# Patient Record
Sex: FEMALE | Race: WHITE
[De-identification: names, ages, dates, MRNs, and addresses within clinical notes are randomized per-mention and may not be internally consistent; named-entity substitution may affect disease eponyms.]

---

## 2019-04-05 PROBLEM — Z00.00 ENCOUNTER FOR PREVENTIVE HEALTH EXAMINATION: Status: ACTIVE | Noted: 2019-04-05

## 2019-04-08 ENCOUNTER — RECORD ABSTRACTING (OUTPATIENT)
Age: 64
End: 2019-04-08

## 2019-04-08 ENCOUNTER — RX RENEWAL (OUTPATIENT)
Age: 64
End: 2019-04-08

## 2019-04-08 ENCOUNTER — APPOINTMENT (OUTPATIENT)
Dept: ORTHOPEDIC SURGERY | Facility: CLINIC | Age: 64
End: 2019-04-08
Payer: COMMERCIAL

## 2019-04-08 DIAGNOSIS — M20.019 MALLET FINGER OF UNSPECIFIED FINGER(S): ICD-10-CM

## 2019-04-08 DIAGNOSIS — M19.91 PRIMARY OSTEOARTHRITIS, UNSPECIFIED SITE: ICD-10-CM

## 2019-04-08 DIAGNOSIS — M19.90 UNSPECIFIED OSTEOARTHRITIS, UNSPECIFIED SITE: ICD-10-CM

## 2019-04-08 DIAGNOSIS — Z82.49 FAMILY HISTORY OF ISCHEMIC HEART DISEASE AND OTHER DISEASES OF THE CIRCULATORY SYSTEM: ICD-10-CM

## 2019-04-08 DIAGNOSIS — I83.93 ASYMPTOMATIC VARICOSE VEINS OF BILATERAL LOWER EXTREMITIES: ICD-10-CM

## 2019-04-08 DIAGNOSIS — Z87.01 PERSONAL HISTORY OF PNEUMONIA (RECURRENT): ICD-10-CM

## 2019-04-08 DIAGNOSIS — S62.609A FRACTURE OF UNSPECIFIED PHALANX OF UNSPECIFIED FINGER, INITIAL ENCOUNTER FOR CLOSED FRACTURE: ICD-10-CM

## 2019-04-08 DIAGNOSIS — Z82.61 FAMILY HISTORY OF ARTHRITIS: ICD-10-CM

## 2019-04-08 DIAGNOSIS — Z80.9 FAMILY HISTORY OF MALIGNANT NEOPLASM, UNSPECIFIED: ICD-10-CM

## 2019-04-08 PROCEDURE — 20610 DRAIN/INJ JOINT/BURSA W/O US: CPT | Mod: 50

## 2019-04-08 PROCEDURE — 99213 OFFICE O/P EST LOW 20 MIN: CPT | Mod: 25

## 2019-04-08 RX ORDER — CLOBETASOL PROPIONATE 0.5 MG/G
0.05 CREAM TOPICAL
Refills: 0 | Status: ACTIVE | COMMUNITY

## 2019-04-08 RX ORDER — HYDROCODONE BITARTRATE AND ACETAMINOPHEN 5; 300 MG/1; MG/1
5-300 TABLET ORAL
Refills: 0 | Status: ACTIVE | COMMUNITY

## 2019-04-08 RX ORDER — ESTRADIOL 0.1 MG/G
0.1 CREAM VAGINAL
Refills: 0 | Status: ACTIVE | COMMUNITY

## 2019-04-08 RX ORDER — HYLAN G-F 20 16MG/2ML
48 SYRINGE (ML) INTRAARTICULAR
Qty: 1 | Refills: 0 | Status: ACTIVE | OUTPATIENT
Start: 2019-04-08

## 2019-04-08 RX ORDER — POLYETHYLENE GLYCOL-3350 AND ELECTROLYTES 236; 6.74; 5.86; 2.97; 22.74 G/274.31G; G/274.31G; G/274.31G; G/274.31G; G/274.31G
236 POWDER, FOR SOLUTION ORAL
Refills: 0 | Status: ACTIVE | COMMUNITY

## 2019-04-08 RX ORDER — OXYCODONE AND ACETAMINOPHEN 7.5; 325 MG/1; MG/1
7.5-325 TABLET ORAL
Refills: 0 | Status: ACTIVE | COMMUNITY

## 2019-04-08 RX ORDER — ALPRAZOLAM 0.5 MG/1
0.5 TABLET ORAL
Refills: 0 | Status: ACTIVE | COMMUNITY

## 2019-04-08 RX ORDER — ASPIRIN 325 MG/1
TABLET, FILM COATED ORAL
Refills: 0 | Status: ACTIVE | COMMUNITY

## 2019-04-08 RX ORDER — ZOLPIDEM TARTRATE 10 MG/1
10 TABLET ORAL
Refills: 0 | Status: ACTIVE | COMMUNITY

## 2019-04-08 RX ORDER — HYALURONATE SOD, CROSS-LINKED 30 MG/3 ML
30 SYRINGE (ML) INTRAARTICULAR
Refills: 0 | Status: ACTIVE | COMMUNITY

## 2019-04-08 RX ORDER — TRIAMTERENE AND HYDROCHLOROTHIAZIDE 25; 37.5 MG/1; MG/1
37.5-25 TABLET ORAL
Refills: 0 | Status: ACTIVE | COMMUNITY

## 2019-04-08 NOTE — ASSESSMENT
[FreeTextEntry1] : The patient is to follow up on an as needed basis and/or once her Visco supplementation is authorized.

## 2019-04-08 NOTE — PROCEDURE
[de-identified] : Patient has demonstrated limited relief from NSAIDS, rest, exercises / PT, and after discussion of the risks and benefits, the patient has elected to proceed with a corticosteroid injection into the BOTH knee(s) via an Anterolateral site.\par Confirmed that the patient does not have history of prior adverse reactions, active, infections, or relevant allergies.   There was no erythema or warmth, and the skin was clear.  The skin was sterilized with alcohol and via sterile technique, the knee was injected 3 cc of 1% xylocaine with 5 mg Kenalog.  The injection was completed without complication and a bandage was applied.  The patient tolerated the procedure well and was given post-injection instructions.\par

## 2019-04-08 NOTE — HISTORY OF PRESENT ILLNESS
[de-identified] : Patient is presenting for followup of bilateral knee arthritis. She states her right knee currently is more painful than the left but both are causing her discomfort. She has had viscous supplementation in the past as well as cortisone injections

## 2019-04-08 NOTE — PHYSICAL EXAM
[de-identified] : Constitutional: General Appearance: healthy-appearing, NAD, and normal body habitus.\par \par Gait and Station: Appearance: limp and antalgic gait.\par \par Knees: Inspection Right: no induration, erythema, or warmth and genu valgum deformity and swelling/effusion mild effusion. Inspection Left: no induration, erythema, or warmth and genu valgum deformity and swelling/effusion mild effusion. Bony Palpation Right: no tenderness of the inferior pole patella, the superior pole patella, the tibial tubercle, the adductor tubercle, the medial joint line, or Gerdy's tubercle and tenderness of the lateral patellar facet, the medial patellar facet, the medial femoral condyle, the lateral joint line, the medial tibial plateau, the lateral tibial plateau, and the lateral femoral condyle. Bony Palpation Left: no tenderness of the inferior pole patella, the superior pole patella, the tibial tubercle, the adductor tubercle, the medial joint line, or Gerdy's tubercle and tenderness of the lateral patellar facet, the medial patellar facet, the medial femoral condyle, the lateral joint line, the medial tibial plateau, the lateral tibial plateau, and the lateral femoral condyle. Soft Tissue Palpation Right: no tenderness of the quadriceps tendon, the patellar tendon, the lateral collateral ligament, the prepatellar bursa, the medial collateral ligament, the pes anserinus, the iliotibial tract, or the popliteal fossa and tenderness of the lateral patellar retinaculum and the medial patellar retinaculum. Soft Tissue Palpation Left: no tenderness of the quadriceps tendon, the patellar tendon, the prepatellar bursa, the medial collateral ligament, the lateral collateral ligament, the pes anserinus, the iliotibial tract, or the popliteal fossa and tenderness of the lateral patellar retinaculum and the medial patellar retinaculum. Active Range of Motion Right: crepitus, flexion (115 deg), extension (5 deg.), and pain at extreme limits of range. Active Range of Motion Left: crepitus, flexion (115 deg.), extension (5 deg.), and pain at extreme limits of range. Strength Right: flexion 5/5 and extension 5/5. Strength Left: flexion 5/5 and extension 5/5.\par \par Skin: Right Lower Extremity: normal. Left Lower Extremity: normal.\par \par Cardiovascular System: Varicosities Right: capillary refill test normal. Varicosities Left: capillary refill test normal.\par \par Neurologic: Sensation on the Right: normal sensation. Sensation on the Left: normal sensation.\par \par Psychiatric: Mood and Affect: normal mood and affect and active and alert.

## 2019-04-08 NOTE — PROCEDURE
[de-identified] : Patient has demonstrated limited relief from NSAIDS, rest, exercises / PT, and after discussion of the risks and benefits, the patient has elected to proceed with a corticosteroid injection into the BOTH knee(s) via an Anterolateral site.\par Confirmed that the patient does not have history of prior adverse reactions, active, infections, or relevant allergies.   There was no erythema or warmth, and the skin was clear.  The skin was sterilized with alcohol and via sterile technique, the knee was injected 3 cc of 1% xylocaine with 5 mg Kenalog.  The injection was completed without complication and a bandage was applied.  The patient tolerated the procedure well and was given post-injection instructions.\par

## 2019-04-08 NOTE — PHYSICAL EXAM
[de-identified] : Constitutional: General Appearance: healthy-appearing, NAD, and normal body habitus.\par \par Gait and Station: Appearance: limp and antalgic gait.\par \par Knees: Inspection Right: no induration, erythema, or warmth and genu valgum deformity and swelling/effusion mild effusion. Inspection Left: no induration, erythema, or warmth and genu valgum deformity and swelling/effusion mild effusion. Bony Palpation Right: no tenderness of the inferior pole patella, the superior pole patella, the tibial tubercle, the adductor tubercle, the medial joint line, or Gerdy's tubercle and tenderness of the lateral patellar facet, the medial patellar facet, the medial femoral condyle, the lateral joint line, the medial tibial plateau, the lateral tibial plateau, and the lateral femoral condyle. Bony Palpation Left: no tenderness of the inferior pole patella, the superior pole patella, the tibial tubercle, the adductor tubercle, the medial joint line, or Gerdy's tubercle and tenderness of the lateral patellar facet, the medial patellar facet, the medial femoral condyle, the lateral joint line, the medial tibial plateau, the lateral tibial plateau, and the lateral femoral condyle. Soft Tissue Palpation Right: no tenderness of the quadriceps tendon, the patellar tendon, the lateral collateral ligament, the prepatellar bursa, the medial collateral ligament, the pes anserinus, the iliotibial tract, or the popliteal fossa and tenderness of the lateral patellar retinaculum and the medial patellar retinaculum. Soft Tissue Palpation Left: no tenderness of the quadriceps tendon, the patellar tendon, the prepatellar bursa, the medial collateral ligament, the lateral collateral ligament, the pes anserinus, the iliotibial tract, or the popliteal fossa and tenderness of the lateral patellar retinaculum and the medial patellar retinaculum. Active Range of Motion Right: crepitus, flexion (115 deg), extension (5 deg.), and pain at extreme limits of range. Active Range of Motion Left: crepitus, flexion (115 deg.), extension (5 deg.), and pain at extreme limits of range. Strength Right: flexion 5/5 and extension 5/5. Strength Left: flexion 5/5 and extension 5/5.\par \par Skin: Right Lower Extremity: normal. Left Lower Extremity: normal.\par \par Cardiovascular System: Varicosities Right: capillary refill test normal. Varicosities Left: capillary refill test normal.\par \par Neurologic: Sensation on the Right: normal sensation. Sensation on the Left: normal sensation.\par \par Psychiatric: Mood and Affect: normal mood and affect and active and alert.

## 2019-04-08 NOTE — HISTORY OF PRESENT ILLNESS
[de-identified] : Patient is presenting for followup of bilateral knee arthritis. She states her right knee currently is more painful than the left but both are causing her discomfort. She has had viscous supplementation in the past as well as cortisone injections

## 2019-05-20 ENCOUNTER — APPOINTMENT (OUTPATIENT)
Dept: ORTHOPEDIC SURGERY | Facility: CLINIC | Age: 64
End: 2019-05-20
Payer: COMMERCIAL

## 2019-05-20 PROCEDURE — 99213 OFFICE O/P EST LOW 20 MIN: CPT | Mod: 25

## 2019-05-20 PROCEDURE — 20610 DRAIN/INJ JOINT/BURSA W/O US: CPT | Mod: 50

## 2019-05-20 NOTE — HISTORY OF PRESENT ILLNESS
[de-identified] : Patient is presenting for evaluation of bilateral knees. She's had cortisone injection as well as discussed supplementation in the past. She would like cortisone injections currently and in attaining gel shots in 3-4 weeks\par

## 2019-05-20 NOTE — PROCEDURE
[de-identified] : Patient has demonstrated limited relief from NSAIDS, rest, exercises / PT, and after discussion of the risks and benefits, the patient has elected to proceed with a corticosteroid injection into the BOTH knee(s) via an Anterolateral site.\par Confirmed that the patient does not have history of prior adverse reactions, active, infections, or relevant allergies.   There was no erythema or warmth, and the skin was clear.  The skin was sterilized with alcohol and via sterile technique, the knee was injected 3 cc of 1% xylocaine with 5 mg Kenalog.  The injection was completed without complication and a bandage was applied.  The patient tolerated the procedure well and was given post-injection instructions.

## 2019-05-20 NOTE — PHYSICAL EXAM
[de-identified] : Bilateral knee\par \par Constitutional: \par The patient is healthy-appearing and in no apparent distress. \par \par Gait:\par The patient ambulates with a normal gait and no limp.\par \par Cardiovascular System: \par There is capillary refill less than 2 seconds. \par \par Skin: \par There is no skin abnormalities.\par \par Left Knee: \par Bony Palpation: \par There is tenderness of the medial and lateral joint line,\par There is tenderness of the medial and lateral femoral chondyle. \par There is tenderness to the medial and lateral patellar facets.\par There is no tenderness of the tibial tubercle, superior or inferior patella.\par \par Soft Tissue Palpation: \par There is no tenderness of the medial and lateral retinaculum, quadriceps tendon, patella tendon, ITB or pes anserine.\par \par Active Range of Motion: \par There is range of motion at the knee 3 - 120 actively and passively. \par \par Special Tests: \par There is a negative Apley and Steinmanns.  There is a negative Lachman, Anterior Drawer, and Posterior Drawer.  There is no varus or valgus laxity.\par \par Strength: \par There is 5/5 hip flexion and 5/5 knee flexion and extension.  \par \par Psychiatric: \par The patient demonstrates a normal mood and affect and is active and alert\par \par \par \par

## 2019-06-27 ENCOUNTER — APPOINTMENT (OUTPATIENT)
Dept: ORTHOPEDIC SURGERY | Facility: CLINIC | Age: 64
End: 2019-06-27
Payer: COMMERCIAL

## 2019-06-27 PROCEDURE — 99213 OFFICE O/P EST LOW 20 MIN: CPT | Mod: 25

## 2019-06-27 PROCEDURE — 20611 DRAIN/INJ JOINT/BURSA W/US: CPT | Mod: 50

## 2019-06-27 NOTE — HISTORY OF PRESENT ILLNESS
[de-identified] : Patient is presenting for evaluation of bilateral knees. She's had cortisone injection as well as discussed supplementation in the past. \par

## 2019-06-27 NOTE — PROCEDURE
[de-identified] : After discussion of the risks and benefits, the patient presented for their SynviscOne injections to the knees.  Confirmed that the patient does not have history of prior adverse reactions, active infections, or relevant allergies.  There was no effusion, erythema, or warmth, and the skin was clear.\par The skin was prepped in the usual sterile manner, ethyl chloride spray was used as a topical anesthetic.  A needle was inserted \par UTILIZING ULTRASOUND GUIDANCE TO LOCALIZE THE NEEDLE IN THE KNEE JOINT\par into the RIGHT and LEFT KNEE via an anterolateral approach.  Viscosupplement was then slowly injected, The injection was completed without complication and a bandage was applied.\par The patient tolerated the procedure well and was given post-injection instructions: no exercise x 48 hours, cold packs and analgesics prn.

## 2019-06-27 NOTE — PHYSICAL EXAM
[de-identified] : Bilateral knee\par \par Constitutional: \par The patient is healthy-appearing and in no apparent distress. \par \par Gait:\par The patient ambulates with a normal gait and no limp.\par \par Cardiovascular System: \par There is capillary refill less than 2 seconds. \par \par Skin: \par There is no skin abnormalities.\par \par Left Knee: \par Bony Palpation: \par There is tenderness of the medial and lateral joint line,\par There is tenderness of the medial and lateral femoral chondyle. \par There is tenderness to the medial and lateral patellar facets.\par There is no tenderness of the tibial tubercle, superior or inferior patella.\par \par Soft Tissue Palpation: \par There is no tenderness of the medial and lateral retinaculum, quadriceps tendon, patella tendon, ITB or pes anserine.\par \par Active Range of Motion: \par There is range of motion at the knee 3 - 120 actively and passively. \par \par Special Tests: \par There is a negative Apley and Steinmanns.  There is a negative Lachman, Anterior Drawer, and Posterior Drawer.  There is no varus or valgus laxity.\par \par Strength: \par There is 5/5 hip flexion and 5/5 knee flexion and extension.  \par \par Psychiatric: \par The patient demonstrates a normal mood and affect and is active and alert\par \par \par \par

## 2019-09-04 ENCOUNTER — OTHER (OUTPATIENT)
Age: 64
End: 2019-09-04

## 2019-09-04 RX ORDER — HYDROCODONE BITARTRATE AND ACETAMINOPHEN 5; 300 MG/1; MG/1
5-300 TABLET ORAL
Qty: 40 | Refills: 0 | Status: ACTIVE | COMMUNITY
Start: 2019-06-27 | End: 1900-01-01

## 2019-10-15 ENCOUNTER — APPOINTMENT (OUTPATIENT)
Dept: ORTHOPEDIC SURGERY | Facility: CLINIC | Age: 64
End: 2019-10-15
Payer: COMMERCIAL

## 2019-10-15 DIAGNOSIS — M17.10 UNILATERAL PRIMARY OSTEOARTHRITIS, UNSPECIFIED KNEE: ICD-10-CM

## 2019-10-15 PROCEDURE — 99213 OFFICE O/P EST LOW 20 MIN: CPT

## 2019-10-16 PROBLEM — M17.10 ARTHRITIS OF KNEE: Status: ACTIVE | Noted: 2019-04-08

## 2019-10-16 NOTE — HISTORY OF PRESENT ILLNESS
[de-identified] : Patient is presenting for evaluation of bilateral knees. \par Patient states she has decided to proceed with knee replacement and has been evaluated by Dr. Pearson at [facility].  She is presenting for followup discussion and recommendations.

## 2019-10-16 NOTE — PHYSICAL EXAM
[de-identified] : Bilateral knee\par \par Constitutional: \par The patient is healthy-appearing and in no apparent distress. \par \par Gait:\par The patient ambulates with a normal gait and no limp.\par \par Cardiovascular System: \par There is capillary refill less than 2 seconds. \par \par Skin: \par There is no skin abnormalities.\par \par Left Knee: \par Bony Palpation: \par There is tenderness of the medial and lateral joint line,\par There is tenderness of the medial and lateral femoral chondyle. \par There is tenderness to the medial and lateral patellar facets.\par There is no tenderness of the tibial tubercle, superior or inferior patella.\par \par Soft Tissue Palpation: \par There is no tenderness of the medial and lateral retinaculum, quadriceps tendon, patella tendon, ITB or pes anserine.\par \par Active Range of Motion: \par There is range of motion at the knee 3 - 120 actively and passively. \par \par Special Tests: \par There is a negative Apley and Steinmanns.  There is a negative Lachman, Anterior Drawer, and Posterior Drawer.  There is no varus or valgus laxity.\par \par Strength: \par There is 5/5 hip flexion and 5/5 knee flexion and extension.  \par \par Psychiatric: \par The patient demonstrates a normal mood and affect and is active and alert\par \par \par \par

## 2019-10-16 NOTE — ASSESSMENT
[FreeTextEntry1] : Discussed at length with the patient her concerns and expectations for a total knee replacement. Her questions are answered and she will elect to proceed with Dr. Pearson.\par

## 2021-04-14 ENCOUNTER — APPOINTMENT (OUTPATIENT)
Dept: ORTHOPEDIC SURGERY | Facility: CLINIC | Age: 66
End: 2021-04-14
Payer: COMMERCIAL

## 2021-04-14 DIAGNOSIS — M25.469 EFFUSION, UNSPECIFIED KNEE: ICD-10-CM

## 2021-04-14 PROCEDURE — 20610 DRAIN/INJ JOINT/BURSA W/O US: CPT | Mod: LT

## 2021-04-14 PROCEDURE — 73562 X-RAY EXAM OF KNEE 3: CPT | Mod: LT

## 2021-04-14 PROCEDURE — 99214 OFFICE O/P EST MOD 30 MIN: CPT | Mod: 25

## 2021-04-14 PROCEDURE — 99072 ADDL SUPL MATRL&STAF TM PHE: CPT

## 2021-04-14 NOTE — PHYSICAL EXAM
[de-identified] : Left knee\par \par Constitutional: \par The patient is healthy-appearing and in no apparent distress. \par \par Gait:\par The patient ambulates with a mild antalgic gait and limp on the LEFT.\par \par Cardiovascular System: \par The capillary refill is less than 2 seconds. \par \par Skin: \par There is a large effusion.\par Incisional scar is healed.\par There is no erythema or significant warmth.\par \par Left Knee:\par  \par Bony Palpation: \par There is no tenderness of the medial joint line. \par There is no tenderness of the lateral joint line.\par There is no tenderness of the medial femoral chondyle.\par There is no tenderness of the lateral femoral chondyle.\par There is no tenderness of the tibial tubercle.\par There is no tenderness of the superior patella.\par There is no tenderness of the inferior patella.\par There is no tenderness of the medial patellar facet.\par There is no tenderness of the lateral patellar facet.\par \par Soft Tissue Palpation: \par There is no tenderness of the medial retinaculum.\par There is no tenderness of the lateral retinaculum.\par There is no tenderness of the quadriceps tendon.\par There is no tenderness of the patella tendon.\par There is no tenderness of the ITB.\par There is no tenderness of the pes anserine.\par \par Active Range of Motion: \par The range of motion at the knee actively and passively is 0 - 80 prior to aspiration and 0 - 135 after aspiration. \par \par Special Tests:  \par There is no AP laxity.  \par There is no varus or valgus laxity.\par \par Strength: \par There is 5/5 hip flexion and 5/5 knee flexion and extension.  \par \par Psychiatric: \par The patient demonstrates a normal mood and affect and is active and alert [de-identified] : X-ray left knee.  Status post total knee replacement with tibial component in mild varus.  No clear evidence of loosening

## 2021-04-14 NOTE — PROCEDURE
[de-identified] : Patient has elected to proceed with an aspiration of the LEFT knee and risks particularly for infection were detailed given the TKR.\par Confirmed that the patient does not have history of prior adverse reactions, active, infections, or relevant allergies.   There was no erythema or warmth, and the skin was clear.  The skin was sterilized with alcohol x 3 and via sterile technique, 220c of serosanginous fluid was aspirate from the knee.   The aspiration was completed without complication and a bandage was applied.  The patient tolerated the procedure well and was given post-injection instructions.

## 2021-04-14 NOTE — ASSESSMENT
[FreeTextEntry1] : Discussed at length the patient recent history as well as imaging and current exam.  Discussed concern given the large effusion with possible concern for underlying infection.  Treatment options were reviewed and patient elects to proceed with aspiration which was performed under sterile technique the patient expressed understanding that an aspiration into a arthroplasty could introduce the risk of infection but the patient elected to proceed.  There was no gross purulence noted in the aspirate but given the large quantity of fluid was sent for culture Gram stain and cell count.  They increased pain concerned fever or erythema patient is to call the office are present emergency room.\par

## 2021-04-14 NOTE — HISTORY OF PRESENT ILLNESS
[de-identified] : Patient is an established patient last seen by me in October 2019.  She states she underwent a year and a half ago bilateral total knee replacement with Dr. Flynn Spicer at [facility].  She states overall she did rather well and one-month ago started to notice pain and swelling in her left knee.  She reports seeing the operative surgeon within the past few weeks and she reports x-rays were obtained and recommended to observe.  When asked she states swelling at the time she saw the surgeon to the same degree.  She denies any fevers denies any warmth.  Complains of pain particularly on bending the knee secondary to the swelling.

## 2021-04-15 LAB
B PERT IGG+IGM PNL SER: NORMAL
COLOR FLD: NORMAL
FLUID INTAKE SUBSTANCE CLASS: NORMAL
LYMPHOCYTES # FLD MANUAL: 4 %
MONOS+MACROS NFR FLD MANUAL: 2 %
NEUTS SEG # FLD MANUAL: 94 %
RBC # FLD MANUAL: 6000 /UL
TOTAL CELLS COUNTED FLD: NORMAL /UL
TUBE TYPE: NORMAL

## 2021-05-06 LAB — GRAM STN ASPIRATE: NORMAL
